# Patient Record
Sex: FEMALE | Race: WHITE | NOT HISPANIC OR LATINO | URBAN - METROPOLITAN AREA
[De-identification: names, ages, dates, MRNs, and addresses within clinical notes are randomized per-mention and may not be internally consistent; named-entity substitution may affect disease eponyms.]

---

## 2017-08-17 ENCOUNTER — COMMUNICATION - RIVER FALLS (OUTPATIENT)
Dept: FAMILY MEDICINE | Facility: CLINIC | Age: 22
End: 2017-08-17
Payer: MEDICAID

## 2017-09-07 ENCOUNTER — RECORDS - HEALTHEAST (OUTPATIENT)
Dept: ADMINISTRATIVE | Facility: OTHER | Age: 22
End: 2017-09-07

## 2017-09-08 ENCOUNTER — RECORDS - HEALTHEAST (OUTPATIENT)
Dept: ADMINISTRATIVE | Facility: OTHER | Age: 22
End: 2017-09-08

## 2017-09-08 ENCOUNTER — COMMUNICATION - HEALTHEAST (OUTPATIENT)
Dept: ADMINISTRATIVE | Facility: CLINIC | Age: 22
End: 2017-09-08

## 2017-09-24 ENCOUNTER — COMMUNICATION - HEALTHEAST (OUTPATIENT)
Dept: SCHEDULING | Facility: CLINIC | Age: 22
End: 2017-09-24

## 2017-10-10 ENCOUNTER — TRANSFERRED RECORDS (OUTPATIENT)
Dept: HEALTH INFORMATION MANAGEMENT | Facility: CLINIC | Age: 22
End: 2017-10-10

## 2017-10-21 ENCOUNTER — RECORDS - HEALTHEAST (OUTPATIENT)
Dept: ADMINISTRATIVE | Facility: OTHER | Age: 22
End: 2017-10-21

## 2017-11-03 ENCOUNTER — OFFICE VISIT - HEALTHEAST (OUTPATIENT)
Dept: ENDOCRINOLOGY | Facility: CLINIC | Age: 22
End: 2017-11-03

## 2017-11-03 DIAGNOSIS — E11.9 DIABETES (H): ICD-10-CM

## 2017-11-03 DIAGNOSIS — E10.65 TYPE 1 DIABETES MELLITUS WITH HYPERGLYCEMIA (H): ICD-10-CM

## 2017-11-03 LAB
CREAT SERPL-MCNC: 0.63 MG/DL (ref 0.6–1.1)
GFR SERPL CREATININE-BSD FRML MDRD: >60 ML/MIN/1.73M2
HBA1C MFR BLD: 8.2 % (ref 3.5–6)

## 2017-11-03 ASSESSMENT — MIFFLIN-ST. JEOR: SCORE: 1414.92

## 2017-11-17 ENCOUNTER — TELEPHONE (OUTPATIENT)
Dept: OBGYN | Facility: CLINIC | Age: 22
End: 2017-11-17

## 2017-11-17 ENCOUNTER — TRANSFERRED RECORDS (OUTPATIENT)
Dept: HEALTH INFORMATION MANAGEMENT | Facility: CLINIC | Age: 22
End: 2017-11-17

## 2017-11-17 DIAGNOSIS — O44.00: Primary | ICD-10-CM

## 2017-11-17 NOTE — TELEPHONE ENCOUNTER
Received fax from [facility] from Dr. Cross asking for US to see if pt has placenta accreta. She was at [facility] for termination around 15w2d (LMP8/2/17). If no accreta, pt is safe to return to [facility] for termination.     Scheduled pt for next Tuesday, confirmed with MD this is appropriate timeframe. She will have US and then visit after with Dr. Sepulveda. If we cannot confirm accreta here in clinic, may need to consider referral to [facility].

## 2017-11-21 ENCOUNTER — OFFICE VISIT (OUTPATIENT)
Dept: OBGYN | Facility: CLINIC | Age: 22
End: 2017-11-21
Attending: OBSTETRICS & GYNECOLOGY
Payer: COMMERCIAL

## 2017-11-21 ENCOUNTER — COMMUNICATION - HEALTHEAST (OUTPATIENT)
Dept: ENDOCRINOLOGY | Facility: CLINIC | Age: 22
End: 2017-11-21

## 2017-11-21 VITALS
DIASTOLIC BLOOD PRESSURE: 74 MMHG | HEIGHT: 64 IN | WEIGHT: 159 LBS | HEART RATE: 81 BPM | SYSTOLIC BLOOD PRESSURE: 109 MMHG | BODY MASS INDEX: 27.14 KG/M2

## 2017-11-21 DIAGNOSIS — Z33.2 ENCOUNTER FOR ELECTIVE TERMINATION OF PREGNANCY: Primary | ICD-10-CM

## 2017-11-21 DIAGNOSIS — O43.212 PLACENTA ACCRETA IN SECOND TRIMESTER: Primary | ICD-10-CM

## 2017-11-21 PROCEDURE — 76815 OB US LIMITED FETUS(S): CPT | Mod: ZF

## 2017-11-21 PROCEDURE — 99212 OFFICE O/P EST SF 10 MIN: CPT | Mod: 25,ZF

## 2017-11-21 RX ORDER — LEVOTHYROXINE SODIUM 50 UG/1
50 TABLET ORAL DAILY
COMMUNITY

## 2017-11-21 RX ORDER — DOXYCYCLINE 100 MG/10ML
100 INJECTION, POWDER, LYOPHILIZED, FOR SOLUTION INTRAVENOUS
Status: CANCELLED | OUTPATIENT
Start: 2017-11-21

## 2017-11-21 RX ORDER — PHENAZOPYRIDINE HYDROCHLORIDE 100 MG/1
200 TABLET, FILM COATED ORAL ONCE
Status: CANCELLED | OUTPATIENT
Start: 2017-11-21 | End: 2017-11-21

## 2017-11-21 NOTE — LETTER
Date:November 22, 2017      Patient was self referred, no letter generated. Do not send.        [facility] Physicians Health Information

## 2017-11-21 NOTE — MR AVS SNAPSHOT
After Visit Summary   11/21/2017    Robin Myhre    MRN: [id number]           Patient Information     Date Of Birth          1995        Visit Information        Provider Department      11/21/2017 11:00 AM [facility] ULTRASOUND [facility]        Today's Diagnoses     Placenta accreta in second trimester    -  1       Follow-ups after your visit        Your next 10 appointments already scheduled     Nov 28, 2017  3:00 PM CST   Procedure with Kiya Springer MD, [facility] RESOURCE PROCEDURE   [facility] ([facility])    [place] [facility] [address]  [facility] [id number] [phone]            Nov 28, 2017  3:30 PM CST   Nurse Visit with [facility] Nurse   [facility] ([facility])    [place] [facility] [address]  [address]  [facility] [address] [phone]            Nov 29, 2017   Procedure with Brianna Sepulveda MD   [facility] [place], Same Day Surgery (--)    [address]  [facility] [address] [phone]              Who to contact     Please call your clinic at [phone] to:    Ask questions about your health    Make or cancel appointments    Discuss your medicines    Learn about your test results    Speak to your doctor   If you have compliments or concerns about an experience at your clinic, or if you wish to file a complaint, please contact [facility] Patient Relations at [phone] or email us at [email or web address]         Additional Information About Your Visit        World Reviewer Information     World Reviewer is an electronic gateway that provides easy, online access to your medical records. With World Reviewer, you can request a clinic appointment, read your test results, renew a prescription or communicate with your care team.     To sign up for World Reviewer visit the website at  [email or web address]   You will be asked to enter the access code listed below, as well as some personal information. Please follow the directions to create your username and password.     Your access code is: [email or web address]  Expires: 2018  6:31 AM     Your access code will  in 90 days. If you need help or a new code, please contact your [facility] or call [phone] for assistance.        Care EveryWhere ID     This is your Care EveryWhere ID. This could be used by other organizations to access your [place] medical records  [id number]        Your Vitals Were     Last Period                   2017            Blood Pressure from Last 3 Encounters:   17 109/74   11 133/97    Weight from Last 3 Encounters:   17 72.1 kg (159 lb)   11 86 kg (189 lb 9.5 oz) (97 %)*     * Growth percentiles are based on [facility] 2-20 Years data.               Primary Care Provider    None Specified       No primary provider on file.        Equal Access to Services     [facility]: Hadii leann lomax Sobandar, waaxda luqadaha, qaybta kaalmayessica currie, frantz garcia . So [facility] [phone].    ATENCIÓN: Si habla español, tiene a ortega disposición servicios gratuitos de asistencia lingüística. Llame al [phone].    We comply with applicable federal civil rights laws and [place] laws. We do not discriminate on the basis of race, color, national origin, age, disability, sex, sexual orientation, or gender identity.            Thank you!     Thank you for choosing [facility]  for your care. Our goal is always to provide you with excellent care. Hearing back from our patients is one way we can continue to improve our services. Please take a few minutes to complete the written survey that you may receive in the mail after your visit with us. Thank you!             Your Updated Medication List - Protect others around  you: Learn how to safely use, store and throw away your medicines at www.disposemymeds.org.          This list is accurate as of: 11/21/17 12:59 PM.  Always use your most recent med list.                   Brand Name Dispense Instructions for use Diagnosis    insulin reg HIGH CONC 500 Units/mL injection    HumuLIN R U-500 HIGH CONC     Inject  Subcutaneous.        levothyroxine 50 MCG tablet    SYNTHROID/LEVOTHROID     Take 50 mcg by mouth daily

## 2017-11-21 NOTE — LETTER
"11/21/2017       RE: Robin Myhre  [address]   [place] [place] [address]     Dear Colleague,    Thank you for referring your patient, Robin Myhre, to the [facility] at [facility]. Please see a copy of my visit note below.    23 yo  at 15+6 BY SONO today here today to evaluate placenta.  [facility] and noted to have a previa and concern for accreta.    Ultrasound here today read by me and Dr. Kan shows placenta previa, can not rule out focal area of accreta.    PMH:  Type 1 DM\  PSH; c/s    O:  /74  Pulse 81  Ht 1.626 m (5' 4\")  Wt 72.1 kg (159 lb)  LMP 07/30/2017  BMI 27.29 kg/m2  See sono report    A/P  IUP at 15+6  Undesired/unplanned pregnancy.  [facility] at [facility] last week.  ultrasound today worrisome for focal acretta in the setting placnta previa and h/o c/s.   D and E in main OR, BAkri in room, hyst tray ready.  Pt aware may be simple D and e r may need uterine packing with BAkri balloon.  Wors case scenario is hysterectomy.  RTC next Tues 11/28 from laminaria insertion, Type and Cross for 2 units and pre-op.  D and E at 7:30 am in main OR with MKM is already scheduled.  [facility] WRTK paperwork.  Brianna Sepulveda MD        Again, thank you for allowing me to participate in the care of your patient.      Sincerely,    Brianna Sepulveda MD      "

## 2017-11-21 NOTE — PROGRESS NOTES
Indication - r/o placenta accreta, h/o C/S. Maternal age 22. LMP 08/02/2017, 15 6/7 weeks. Estimated Date of Delivery: 05/09/2018. This was a transabdominal study.    Fetal presentation - breech.    FHR = 172bpm    Placenta - anterior/left posterior - possible small focal area of placenta accreta. Placenta previa is also seen.    BRANDON = normal.    Cervix = 3.3 cm.      KAILA Long MD

## 2017-11-21 NOTE — NURSING NOTE
Chief Complaint   Patient presents with     Consult     US follow up from [facility] for possible accreta.

## 2017-11-21 NOTE — LETTER
11/21/2017       RE: Robin Myhre  [address]   [place] [address]     Dear Colleague,    Thank you for referring your patient, Robin Myhre, to the [facility] at [facility]. Please see a copy of my visit note below.    Indication - r/o placenta accreta, h/o C/S. Maternal age 22. LMP 08/02/2017, 15 6/7 weeks. Estimated Date of Delivery: 05/09/2018. This was a transabdominal study.    Fetal presentation - breech.    FHR = 172bpm    Placenta - anterior/left posterior - possible small focal area of placenta accreta. Placenta previa is also seen.    BRANDON = normal.    Cervix = 3.3 cm.      KAILA Long MD        Again, thank you for allowing me to participate in the care of your patient.      Sincerely,    [facility]

## 2017-11-21 NOTE — MR AVS SNAPSHOT
After Visit Summary   11/21/2017    Robin Myhre    MRN: [id number]           Patient Information     Date Of Birth          1995        Visit Information        Provider Department      11/21/2017 11:30 AM Brianna Sepulveda MD [facility]        Today's Diagnoses     Encounter for elective termination of pregnancy    -  1       Follow-ups after your visit        Your next 10 appointments already scheduled     Nov 28, 2017  3:00 PM CST   Procedure with Kiya Springer MD, [facility] RESOURCE PROCEDURE   [facility] ([facility])    [place] [facility] [address]  [address]  [facility] [address] [phone]            Nov 28, 2017  3:30 PM CST   Nurse Visit with [facility] Nurse   [facility] ([facility])    [place] [facility] [address]  [address]  [address]  [facility] [id number] [phone]            Nov 29, 2017   Procedure with Brianna Sepulveda MD   [facility], Same Day Surgery (--)    [address]  [facility] [address] [phone]              Who to contact     Please call your clinic at [phone] to:    Ask questions about your health    Make or cancel appointments    Discuss your medicines    Learn about your test results    Speak to your doctor   If you have compliments or concerns about an experience at your clinic, or if you wish to file a complaint, please contact [facility] Phys[facility] Patient Relations at [phone] or email us at [email or web address]         Additional Information About Your Visit        Twoodohart Information     nfon is an electronic gateway that provides easy, online access to your medical records. With nfon, you can request a clinic appointment, read your test results, renew a prescription or communicate with your care team.     To sign up for nfon visit the website at  "[email or web address]   You will be asked to enter the access code listed below, as well as some personal information. Please follow the directions to create your username and password.     Your access code is: [email or web address]  Expires: 2018  6:31 AM     Your access code will  in 90 days. If you need help or a new code, please contact your [facility] Clinic or call [phone] for assistance.        Care EveryWhere ID     This is your Care EveryWhere ID. This could be used by other organizations to access your [place] medical records  [id number]        Your Vitals Were     Pulse Height Last Period BMI (Body Mass Index)          81 1.626 m (5' 4\") 2017 27.29 kg/m2         Blood Pressure from Last 3 Encounters:   17 109/74   11 133/97    Weight from Last 3 Encounters:   17 72.1 kg (159 lb)   11 86 kg (189 lb 9.5 oz) (97 %)*     * Growth percentiles are based on [facility] 2-20 Years data.              We Performed the Following     Imelda-Operative Worksheet (WHS)        Primary Care Provider    None Specified       No primary provider on file.        Equal Access to Services     [facility] : Hadii leann fuenteso Sobandar, waaxda luqadaha, qaybta kaalmada adeegyada, frantz garcia . So [facility] [phone].    ATENCIÓN: Si habla español, tiene a ortega disposición servicios gratuitos de asistencia lingüística. Llame al [phone].    We comply with applicable federal civil rights laws and [place] laws. We do not discriminate on the basis of race, color, national origin, age, disability, sex, sexual orientation, or gender identity.            Thank you!     Thank you for choosing [facility]  for your care. Our goal is always to provide you with excellent care. Hearing back from our patients is one way we can continue to improve our services. Please take a few minutes to complete the written survey that you may " receive in the mail after your visit with us. Thank you!             Your Updated Medication List - Protect others around you: Learn how to safely use, store and throw away your medicines at www.disposemymeds.org.          This list is accurate as of: 11/21/17  1:50 PM.  Always use your most recent med list.                   Brand Name Dispense Instructions for use Diagnosis    insulin reg HIGH CONC 500 Units/mL injection    HumuLIN R U-500 HIGH CONC     Inject  Subcutaneous.        levothyroxine 50 MCG tablet    SYNTHROID/LEVOTHROID     Take 50 mcg by mouth daily

## 2017-11-21 NOTE — PROGRESS NOTES
"21 yo  at 15+6 BY SONO today here today to evaluate placenta.  [facility] and noted to have a previa and concern for accreta.    Ultrasound here today read by me and Dr. Kan shows placenta previa, can not rule out focal area of accreta.    PMH:  Type 1 DM\  PSH; c/s    O:  /74  Pulse 81  Ht 1.626 m (5' 4\")  Wt 72.1 kg (159 lb)  LMP 07/30/2017  BMI 27.29 kg/m2  See sono report    A/P  IUP at 15+6  Undesired/unplanned pregnancy.  Was at [facility] last week.  ultrasound today worrisome for focal acretta in the setting placnta previa and h/o c/s.   D and E in main OR, BAkri in room, hyst tray ready.  Pt aware may be simple D and e r may need uterine packing with BAkri balloon.  Wors case scenario is hysterectomy.  RTC next Tues 11/28 from laminaria insertion, Type and Cross for 2 units and pre-op.  D and E at 7:30 am in main OR with MKM is already scheduled.  [facility] WRTK paperwork.  Brianna Sepulveda MD      "

## 2017-11-22 ENCOUNTER — ANESTHESIA EVENT (OUTPATIENT)
Dept: SURGERY | Facility: CLINIC | Age: 22
End: 2017-11-22
Payer: COMMERCIAL

## 2017-11-22 ENCOUNTER — TELEPHONE (OUTPATIENT)
Dept: OBGYN | Facility: CLINIC | Age: 22
End: 2017-11-22

## 2017-11-22 NOTE — TELEPHONE ENCOUNTER
Moved pt appointment time on Tuesday to 2:30 nurse/ 2:45 resident for lams as [facility] is unable to perform. Called patient and left voivemail- asked pt to call back to confirm she is aware of time change

## 2017-11-28 ENCOUNTER — TELEPHONE (OUTPATIENT)
Dept: OBGYN | Facility: CLINIC | Age: 22
End: 2017-11-28

## 2017-11-28 ENCOUNTER — OFFICE VISIT (OUTPATIENT)
Dept: OBGYN | Facility: CLINIC | Age: 22
End: 2017-11-28
Payer: MEDICAID

## 2017-11-28 ENCOUNTER — ALLIED HEALTH/NURSE VISIT (OUTPATIENT)
Dept: OBGYN | Facility: CLINIC | Age: 22
End: 2017-11-28
Payer: MEDICAID

## 2017-11-28 DIAGNOSIS — Z01.818 PRE-OP EXAM: ICD-10-CM

## 2017-11-28 DIAGNOSIS — Z33.2 TERMINATION OF PREGNANCY (FETUS): Primary | ICD-10-CM

## 2017-11-28 DIAGNOSIS — N88.2 STENOSIS OF CERVIX UTERI: Primary | ICD-10-CM

## 2017-11-28 DIAGNOSIS — N88.2 CERVICAL STENOSIS (UTERINE CERVIX): Primary | ICD-10-CM

## 2017-11-28 DIAGNOSIS — O26.899 RH NEGATIVE, ANTEPARTUM: ICD-10-CM

## 2017-11-28 DIAGNOSIS — Z67.91 RH NEGATIVE, ANTEPARTUM: ICD-10-CM

## 2017-11-28 LAB
ABO + RH BLD: NORMAL
ABO + RH BLD: NORMAL
BLD GP AB SCN SERPL QL: NORMAL
BLOOD BANK CMNT PATIENT-IMP: NORMAL
ERYTHROCYTE [DISTWIDTH] IN BLOOD BY AUTOMATED COUNT: 16.5 % (ref 10–15)
HCT VFR BLD AUTO: 37.3 % (ref 35–47)
HGB BLD-MCNC: 12.4 G/DL (ref 11.7–15.7)
MCH RBC QN AUTO: 27.5 PG (ref 26.5–33)
MCHC RBC AUTO-ENTMCNC: 33.2 G/DL (ref 31.5–36.5)
MCV RBC AUTO: 83 FL (ref 78–100)
PLATELET # BLD AUTO: 228 10E9/L (ref 150–450)
RBC # BLD AUTO: 4.51 10E12/L (ref 3.8–5.2)
SPECIMEN EXP DATE BLD: NORMAL
WBC # BLD AUTO: 6.8 10E9/L (ref 4–11)

## 2017-11-28 PROCEDURE — 86900 BLOOD TYPING SEROLOGIC ABO: CPT | Performed by: STUDENT IN AN ORGANIZED HEALTH CARE EDUCATION/TRAINING PROGRAM

## 2017-11-28 PROCEDURE — 86850 RBC ANTIBODY SCREEN: CPT | Performed by: STUDENT IN AN ORGANIZED HEALTH CARE EDUCATION/TRAINING PROGRAM

## 2017-11-28 PROCEDURE — S0190 MIFEPRISTONE, ORAL, 200 MG: HCPCS | Mod: ZF

## 2017-11-28 PROCEDURE — 36415 COLL VENOUS BLD VENIPUNCTURE: CPT | Performed by: STUDENT IN AN ORGANIZED HEALTH CARE EDUCATION/TRAINING PROGRAM

## 2017-11-28 PROCEDURE — 25000132 ZZH RX MED GY IP 250 OP 250 PS 637: Mod: ZF

## 2017-11-28 PROCEDURE — 85027 COMPLETE CBC AUTOMATED: CPT | Performed by: STUDENT IN AN ORGANIZED HEALTH CARE EDUCATION/TRAINING PROGRAM

## 2017-11-28 PROCEDURE — 59200 INSERT CERVICAL DILATOR: CPT | Mod: 74

## 2017-11-28 PROCEDURE — 86901 BLOOD TYPING SEROLOGIC RH(D): CPT | Performed by: STUDENT IN AN ORGANIZED HEALTH CARE EDUCATION/TRAINING PROGRAM

## 2017-11-28 RX ORDER — METRONIDAZOLE 500 MG/1
500 TABLET ORAL ONCE
Qty: 1 TABLET | Refills: 0 | Status: ON HOLD | OUTPATIENT
Start: 2017-11-28 | End: 2017-11-29

## 2017-11-28 RX ORDER — OXYCODONE AND ACETAMINOPHEN 10; 325 MG/1; MG/1
1 TABLET ORAL EVERY 6 HOURS PRN
Qty: 6 TABLET | Refills: 0 | Status: SHIPPED | OUTPATIENT
Start: 2017-11-28

## 2017-11-28 RX ORDER — MISOPROSTOL 200 UG/1
TABLET ORAL
Qty: 2 TABLET | Refills: 0 | Status: ON HOLD | OUTPATIENT
Start: 2017-11-28 | End: 2017-11-29

## 2017-11-28 RX ORDER — MISOPROSTOL 200 UG/1
TABLET ORAL
Qty: 2 TABLET | Refills: 0 | Status: SHIPPED | OUTPATIENT
Start: 2017-11-28 | End: 2017-11-28

## 2017-11-28 RX ORDER — PROMETHAZINE HYDROCHLORIDE 25 MG/1
25 TABLET ORAL EVERY 6 HOURS PRN
Qty: 6 TABLET | Refills: 0 | Status: SHIPPED | OUTPATIENT
Start: 2017-11-28

## 2017-11-28 RX ORDER — MIFEPRISTONE 200 MG/1
200 TABLET ORAL ONCE
Qty: 1 TABLET | Refills: 0 | Status: ON HOLD | OUTPATIENT
Start: 2017-11-28 | End: 2017-11-29

## 2017-11-28 RX ORDER — PROMETHAZINE HYDROCHLORIDE 25 MG/1
25 TABLET ORAL EVERY 6 HOURS PRN
Qty: 6 TABLET | Refills: 0 | Status: SHIPPED | OUTPATIENT
Start: 2017-11-28 | End: 2017-11-28

## 2017-11-28 NOTE — PATIENT INSTRUCTIONS
You were seen for cervical dilator placement which were not able to be placed in your cervix.  Instead you were given mifepristone to start your pregnancy termination. This medication may cause infection, bleeding and cramping.  Come to the emergency department if you have fever, severe abdominal pain or heavy bleeding soaking more than a pad in 1 hour.  If your bleeding is very heavy and you are feeling lightheaded at all, call 911 for an ambulance to take you to the hospital.  For moderate pain you may take the percocet as needed. For nausea and nerves you may take the phenergan.  3 HOURS BEFORE YOUR PROCEDURE: Place 1 tablet of misoprostol in each cheek and let dissolve.

## 2017-11-28 NOTE — PROGRESS NOTES
[facility]  Gynecology Pre-op H&P    HPI:    Robin Myhre is a 22 year old  @ 16+6 by 15+6wk US, here for pre-op H&P and laminaria placement in preparation for D&E tomorrow with Dr. Sepulveda. Pregnancy c/b placenta previa and cannot rule out focal accreta. This is an undesired pregnancy and due to her placentation complication requires D&E in the hospital.  Raghav is feeling nervous but otherwise ok. Lots of good questions about likelihood of requiring hysterectomy, etc.     OBHx  Obstetric History       T1      L1     SAB0   TAB0   Ectopic0   Multiple0   Live Births0       # Outcome Date GA Lbr Mark/2nd Weight Sex Delivery Anes PTL Lv   3 Current            2             1 Term                 PMHx: DM1, hypothyroidism  PSHx: CSx1  Meds: Insulin pump-humulin, flagyl  Allergies:  Kelfex    SocHx: No tobacco this pregnancy, former smoker. No etOH or drugs.    ROS: 10-Point ROS negative except as noted in HPI    Physical Exam  LMP 2017   Gen: Well-appearing, NAD  Cardiac: RRR, no m/r/g  Pulm:  CTAB  Abd: Soft, non-tender, non-distended  Ext: No LE edema, extremities warm and well perfused    Pelvic:  Normal appearing external female genitalia. Normal hair distribution. Vagina is without lesions. Physiologic discharge. Cervix multiparous, no lesions, no cervical motion tenderness.    US (17):   Fetal presentation - breech.  FHR = 172bpm  Placenta - anterior/left posterior - possible small focal area of placenta accreta. Placenta previa is also seen.  BRANDON = normal.  Cervix = 3.3 cm.    Assessment/Plan:  Robin Myhre is a 22 year old  @ 16+6 here for laminaria placement in preparation for D&E in pregnancy c/b placenta previa with possible accreta.  - T&C x3 units, Hgb, to get drawn today  - see separate laminaria placement procedure note -- unable to place laminaria.   - given mifepristone in clinic  - miso 400 mcg buccally 3 hours before case  - RX for Phenergan and Percocet  given to take PRN overnight    - strict precautions on when to come to the ED/call 911 overnight.    Heavy bleeding >1 pad per hour, dizziness, severe abd pain, fever    Staffed with Dr. Daniel Plasencia MD  OBGYN PGY-2  [phone]  2017, 3:28 PM    Laminaria Placement Procedure    Patient name,  verified. Discussed the risks, benefits and alternatives with the patient. She agreed to proceed, written informed consent obatined.  Speculum was placed in vaginal vault with above noted findings. The cervix was cleaned with betadine then grasped at the anterior lip following 1mL infiltration with 1% plain lidocaine. Paracervical block was achieved via 8mL lidocaine at 4 and 8 o clock of the cervicovaginal junction. The dilipan was attempted to be placed with traction on the tenaculum but firm resistance was met at the internal os. A Hegar dilator similarly met excess resistance of the internal os and this the procedure was aborted. Tenaculum and speculum removed. Pt tolerated the procedure well.    Caitie Plasencia MD  OB Gyn PGY2  17 4:53pm    The Patient was seen in Resident Continuity Clinic by    CAITIE PLASENCIA  [facility] RESOURCE PROCEDURE.  I reviewed the history & exam. Assessment and plan were jointly made.  I was present for exam and attempt to place laminaria.     Anyi Sanchez MD

## 2017-11-28 NOTE — TELEPHONE ENCOUNTER
Pt called to inform she may need to cancel LAMs and surgery tomorrow morning due to court date she has in the morning on 11/29. Pt will be attempting to move other obligations to make this appointment.

## 2017-11-28 NOTE — TELEPHONE ENCOUNTER
Called and spoke with patient regarding appointment time change. Pt expresses understanding of this information and states will present to clinic at 2:30.

## 2017-11-28 NOTE — MR AVS SNAPSHOT
After Visit Summary   11/28/2017    Robin Myhre    MRN: [id number]           Patient Information     Date Of Birth          1995        Visit Information        Provider Department      11/28/2017 2:45 PM Viviane Plasencia MD; [facility] RESOURCE PROCEDURE [facility]        Today's Diagnoses     Termination of pregnancy (fetus)    -  1      Care Instructions    You were seen for cervical dilator placement which were not able to be placed in your cervix.  Instead you were given mifepristone to start your pregnancy termination. This medication may cause infection, bleeding and cramping.  Come to the emergency department if you have fever, severe abdominal pain or heavy bleeding soaking more than a pad in 1 hour.  If your bleeding is very heavy and you are feeling lightheaded at all, call 911 for an ambulance to take you to the hospital.  For moderate pain you may take the percocet as needed. For nausea and nerves you may take the phenergan.  3 HOURS BEFORE YOUR PROCEDURE: Place 1 tablet of misoprostol in each cheek and let dissolve.          Follow-ups after your visit        Your next 10 appointments already scheduled     Nov 29, 2017   Procedure with Brianna Sepulveda MD   [facility], [place], Same Day Surgery (--)    [address] [facility] [address] [phone]              Who to contact     Please call your clinic at [phone] to:    Ask questions about your health    Make or cancel appointments    Discuss your medicines    Learn about your test results    Speak to your doctor   If you have compliments or concerns about an experience at your clinic, or if you wish to file a complaint, please contact [facility] Patient Relations at [phone] or email us at [email or web address]         Additional Information About Your Visit        Referronhart Information     HiperScan is an electronic gateway that provides easy, online access  to your medical records. With AwesomeHighlighter, you can request a clinic appointment, read your test results, renew a prescription or communicate with your care team.     To sign up for AwesomeHighlighter visit the website at [email or web address]   You will be asked to enter the access code listed below, as well as some personal information. Please follow the directions to create your username and password.     Your access code is: [email or web address]  Expires: 2018  6:31 AM     Your access code will  in 90 days. If you need help or a new code, please contact your [facility] or call [phone] for assistance.        Care EveryWhere ID     This is your Care EveryWhere ID. This could be used by other organizations to access your [place] medical records  [id number]        Your Vitals Were     Last Period                   2017            Blood Pressure from Last 3 Encounters:   17 109/74   11 133/97    Weight from Last 3 Encounters:   17 72.1 kg (159 lb)   11 86 kg (189 lb 9.5 oz) (97 %)*     * Growth percentiles are based on CDC 2-20 Years data.              We Performed the Following     ABO/Rh Type and Screen     CBC with Platelets          Today's Medication Changes          These changes are accurate as of: 17  4:32 PM.  If you have any questions, ask your nurse or doctor.               Start taking these medicines.        Dose/Directions    metroNIDAZOLE 500 MG tablet   Commonly known as:  FLAGYL   Used for:  Pre-op exam   Started by:  Nurse, Ump Whs        Dose:  500 mg   Take 1 tablet (500 mg) by mouth once for 1 dose   Quantity:  1 tablet   Refills:  0       * misoprostol 200 MCG tablet   Commonly known as:  CYTOTEC   Used for:  Cervical stenosis (uterine cervix)   Started by:  Nurse, Ump Whs        Take 2-3 hours before SURGERY. Place 1 tab between cheek and gum on the right, and 2nd tab on the left. Dissolve for 30 min, then swallow.   Quantity:   2 tablet   Refills:  0       * misoprostol 200 MCG tablet   Commonly known as:  CYTOTEC   Used for:  Termination of pregnancy (fetus)   Started by:  Viviane Plasencia MD        Place 1 tablet in each cheek 3 hours prior to your procedure. Let dissolve. After 30 minutes can swallow what has not dissolved.   Quantity:  2 tablet   Refills:  0       oxyCODONE-acetaminophen  MG per tablet   Commonly known as:  PERCOCET   Used for:  Termination of pregnancy (fetus)   Started by:  Viviane Plasencia MD        Dose:  1 tablet   Take 1 tablet by mouth every 6 hours as needed for moderate to severe pain maximum 6 tablet(s) per day   Quantity:  6 tablet   Refills:  0       promethazine 25 MG tablet   Commonly known as:  PHENERGAN   Used for:  Termination of pregnancy (fetus)   Started by:  Viviane Plasencia MD        Dose:  25 mg   Take 1 tablet (25 mg) by mouth every 6 hours as needed for nausea   Quantity:  6 tablet   Refills:  0       * Notice:  This list has 2 medication(s) that are the same as other medications prescribed for you. Read the directions carefully, and ask your doctor or other care provider to review them with you.         Where to get your medicines      These medications were sent to [place], [place] - [address]  [address] [address]     Phone:  [phone]     misoprostol 200 MCG tablet    promethazine 25 MG tablet         These medications were sent to [facility] [address] [place] [address] [address] [facility]  [address] [facility] [facility] [address]    Hours:  24-hours Phone:  [phone]     misoprostol 200 MCG tablet         Some of these will need a paper prescription and others can be bought over the counter.  Ask your nurse if you have questions.     Bring a paper prescription for each of these medications     metroNIDAZOLE 500 MG tablet    oxyCODONE-acetaminophen  MG per tablet                 Primary Care Provider    None Specified       No primary provider on file.        Equal Access to Services     WILLIAM DERAS : Hadii aad ku hadjoseemily Doss, wazuriyessica lehman, jordonarnulfo petercatiefrantz weaverlorenzoute patel. So [facility] [phone].    ATENCIÓN: Si habla español, tiene a ortega disposición servicios gratuitos de asistencia lingüística. Llame al [phone].    We comply with applicable federal civil rights laws and [place] laws. We do not discriminate on the basis of race, color, national origin, age, disability, sex, sexual orientation, or gender identity.            Thank you!     Thank you for choosing [facility]  for your care. Our goal is always to provide you with excellent care. Hearing back from our patients is one way we can continue to improve our services. Please take a few minutes to complete the written survey that you may receive in the mail after your visit with us. Thank you!             Your Updated Medication List - Protect others around you: Learn how to safely use, store and throw away your medicines at www.disposemymeds.org.          This list is accurate as of: 11/28/17  4:32 PM.  Always use your most recent med list.                   Brand Name Dispense Instructions for use Diagnosis    insulin reg HIGH CONC 500 Units/mL injection    HumuLIN R U-500 HIGH CONC     Inject  Subcutaneous.        levothyroxine 50 MCG tablet    SYNTHROID/LEVOTHROID     Take 50 mcg by mouth daily        metroNIDAZOLE 500 MG tablet    FLAGYL    1 tablet    Take 1 tablet (500 mg) by mouth once for 1 dose    Pre-op exam       * misoprostol 200 MCG tablet    CYTOTEC    2 tablet    Take 2-3 hours before SURGERY. Place 1 tab between cheek and gum on the right, and 2nd tab on the left. Dissolve for 30 min, then swallow.    Cervical stenosis (uterine cervix)       * misoprostol 200 MCG tablet    CYTOTEC    2 tablet    Place 1 tablet in each cheek 3 hours  prior to your procedure. Let dissolve. After 30 minutes can swallow what has not dissolved.    Termination of pregnancy (fetus)       oxyCODONE-acetaminophen  MG per tablet    PERCOCET    6 tablet    Take 1 tablet by mouth every 6 hours as needed for moderate to severe pain maximum 6 tablet(s) per day    Termination of pregnancy (fetus)       promethazine 25 MG tablet    PHENERGAN    6 tablet    Take 1 tablet (25 mg) by mouth every 6 hours as needed for nausea    Termination of pregnancy (fetus)       * Notice:  This list has 2 medication(s) that are the same as other medications prescribed for you. Read the directions carefully, and ask your doctor or other care provider to review them with you.

## 2017-11-29 ENCOUNTER — TELEPHONE (OUTPATIENT)
Dept: OBGYN | Facility: CLINIC | Age: 22
End: 2017-11-29

## 2017-11-29 ENCOUNTER — HOSPITAL ENCOUNTER (OUTPATIENT)
Facility: CLINIC | Age: 22
Discharge: HOME | End: 2017-11-29
Attending: OBSTETRICS & GYNECOLOGY | Admitting: OBSTETRICS & GYNECOLOGY
Payer: COMMERCIAL

## 2017-11-29 ENCOUNTER — ANESTHESIA (OUTPATIENT)
Dept: SURGERY | Facility: CLINIC | Age: 22
End: 2017-11-29
Payer: COMMERCIAL

## 2017-11-29 ENCOUNTER — SURGERY (OUTPATIENT)
Age: 22
End: 2017-11-29

## 2017-11-29 VITALS
HEART RATE: 85 BPM | SYSTOLIC BLOOD PRESSURE: 115 MMHG | HEIGHT: 64 IN | TEMPERATURE: 98.7 F | DIASTOLIC BLOOD PRESSURE: 80 MMHG | BODY MASS INDEX: 27.14 KG/M2 | WEIGHT: 158.95 LBS | OXYGEN SATURATION: 96 % | RESPIRATION RATE: 15 BRPM

## 2017-11-29 DIAGNOSIS — G89.18 POST-OP PAIN: Primary | ICD-10-CM

## 2017-11-29 LAB
BLOOD BANK CMNT PATIENT-IMP: NORMAL
BLOOD BANK CMNT PATIENT-IMP: NORMAL
GLUCOSE BLDC GLUCOMTR-MCNC: 108 MG/DL (ref 70–99)

## 2017-11-29 PROCEDURE — 86900 BLOOD TYPING SEROLOGIC ABO: CPT | Performed by: OBSTETRICS & GYNECOLOGY

## 2017-11-29 PROCEDURE — 25000128 H RX IP 250 OP 636: Performed by: ANESTHESIOLOGY

## 2017-11-29 PROCEDURE — 25000128 H RX IP 250 OP 636: Performed by: NURSE ANESTHETIST, CERTIFIED REGISTERED

## 2017-11-29 PROCEDURE — 40000170 ZZH STATISTIC PRE-PROCEDURE ASSESSMENT II: Performed by: OBSTETRICS & GYNECOLOGY

## 2017-11-29 PROCEDURE — 36415 COLL VENOUS BLD VENIPUNCTURE: CPT | Performed by: OBSTETRICS & GYNECOLOGY

## 2017-11-29 PROCEDURE — 86901 BLOOD TYPING SEROLOGIC RH(D): CPT | Performed by: OBSTETRICS & GYNECOLOGY

## 2017-11-29 PROCEDURE — 88300 SURGICAL PATH GROSS: CPT | Mod: 26 | Performed by: OBSTETRICS & GYNECOLOGY

## 2017-11-29 PROCEDURE — 25000132 ZZH RX MED GY IP 250 OP 250 PS 637: Performed by: STUDENT IN AN ORGANIZED HEALTH CARE EDUCATION/TRAINING PROGRAM

## 2017-11-29 PROCEDURE — 36000051 ZZH SURGERY LEVEL 2 1ST 30 MIN - UMMC: Performed by: OBSTETRICS & GYNECOLOGY

## 2017-11-29 PROCEDURE — 25000125 ZZHC RX 250: Performed by: NURSE ANESTHETIST, CERTIFIED REGISTERED

## 2017-11-29 PROCEDURE — 37000008 ZZH ANESTHESIA TECHNICAL FEE, 1ST 30 MIN: Performed by: OBSTETRICS & GYNECOLOGY

## 2017-11-29 PROCEDURE — 71000027 ZZH RECOVERY PHASE 2 EACH 15 MINS: Performed by: OBSTETRICS & GYNECOLOGY

## 2017-11-29 PROCEDURE — 25000132 ZZH RX MED GY IP 250 OP 250 PS 637: Performed by: NURSE ANESTHETIST, CERTIFIED REGISTERED

## 2017-11-29 PROCEDURE — 27210794 ZZH OR GENERAL SUPPLY STERILE: Performed by: OBSTETRICS & GYNECOLOGY

## 2017-11-29 PROCEDURE — 85461 HEMOGLOBIN FETAL: CPT | Performed by: OBSTETRICS & GYNECOLOGY

## 2017-11-29 PROCEDURE — 25000128 H RX IP 250 OP 636: Performed by: STUDENT IN AN ORGANIZED HEALTH CARE EDUCATION/TRAINING PROGRAM

## 2017-11-29 PROCEDURE — 25000125 ZZHC RX 250: Performed by: OBSTETRICS & GYNECOLOGY

## 2017-11-29 PROCEDURE — 88300 SURGICAL PATH GROSS: CPT | Performed by: OBSTETRICS & GYNECOLOGY

## 2017-11-29 PROCEDURE — 36000053 ZZH SURGERY LEVEL 2 EA 15 ADDTL MIN - UMMC: Performed by: OBSTETRICS & GYNECOLOGY

## 2017-11-29 PROCEDURE — 37000009 ZZH ANESTHESIA TECHNICAL FEE, EACH ADDTL 15 MIN: Performed by: OBSTETRICS & GYNECOLOGY

## 2017-11-29 PROCEDURE — 82962 GLUCOSE BLOOD TEST: CPT

## 2017-11-29 RX ORDER — DEXAMETHASONE SODIUM PHOSPHATE 4 MG/ML
INJECTION, SOLUTION INTRA-ARTICULAR; INTRALESIONAL; INTRAMUSCULAR; INTRAVENOUS; SOFT TISSUE PRN
Status: DISCONTINUED | OUTPATIENT
Start: 2017-11-29 | End: 2017-11-29

## 2017-11-29 RX ORDER — OXYCODONE AND ACETAMINOPHEN 5; 325 MG/1; MG/1
1 TABLET ORAL EVERY 4 HOURS PRN
Status: DISCONTINUED | OUTPATIENT
Start: 2017-11-29 | End: 2017-11-29 | Stop reason: HOSPADM

## 2017-11-29 RX ORDER — DOXYCYCLINE 100 MG/10ML
100 INJECTION, POWDER, LYOPHILIZED, FOR SOLUTION INTRAVENOUS
Status: COMPLETED | OUTPATIENT
Start: 2017-11-29 | End: 2017-11-29

## 2017-11-29 RX ORDER — PROPOFOL 10 MG/ML
INJECTION, EMULSION INTRAVENOUS CONTINUOUS PRN
Status: DISCONTINUED | OUTPATIENT
Start: 2017-11-29 | End: 2017-11-29

## 2017-11-29 RX ORDER — NALOXONE HYDROCHLORIDE 0.4 MG/ML
.1-.4 INJECTION, SOLUTION INTRAMUSCULAR; INTRAVENOUS; SUBCUTANEOUS
Status: DISCONTINUED | OUTPATIENT
Start: 2017-11-29 | End: 2017-11-29 | Stop reason: HOSPADM

## 2017-11-29 RX ORDER — FENTANYL CITRATE 50 UG/ML
INJECTION, SOLUTION INTRAMUSCULAR; INTRAVENOUS PRN
Status: DISCONTINUED | OUTPATIENT
Start: 2017-11-29 | End: 2017-11-29

## 2017-11-29 RX ORDER — IBUPROFEN 600 MG/1
600 TABLET, FILM COATED ORAL
Status: COMPLETED | OUTPATIENT
Start: 2017-11-29 | End: 2017-11-29

## 2017-11-29 RX ORDER — ONDANSETRON 2 MG/ML
4 INJECTION INTRAMUSCULAR; INTRAVENOUS EVERY 30 MIN PRN
Status: DISCONTINUED | OUTPATIENT
Start: 2017-11-29 | End: 2017-11-29 | Stop reason: HOSPADM

## 2017-11-29 RX ORDER — OXYCODONE AND ACETAMINOPHEN 5; 325 MG/1; MG/1
1 TABLET ORAL ONCE
Status: DISCONTINUED | OUTPATIENT
Start: 2017-11-29 | End: 2017-11-29 | Stop reason: HOSPADM

## 2017-11-29 RX ORDER — LIDOCAINE HYDROCHLORIDE 10 MG/ML
INJECTION, SOLUTION INFILTRATION; PERINEURAL PRN
Status: DISCONTINUED | OUTPATIENT
Start: 2017-11-29 | End: 2017-11-29

## 2017-11-29 RX ORDER — LIDOCAINE 40 MG/G
CREAM TOPICAL
Status: DISCONTINUED | OUTPATIENT
Start: 2017-11-29 | End: 2017-11-29 | Stop reason: HOSPADM

## 2017-11-29 RX ORDER — OXYCODONE AND ACETAMINOPHEN 5; 325 MG/1; MG/1
1 TABLET ORAL EVERY 4 HOURS PRN
Qty: 5 TABLET | Refills: 0 | Status: SHIPPED | OUTPATIENT
Start: 2017-11-29

## 2017-11-29 RX ORDER — IBUPROFEN 600 MG/1
600 TABLET, FILM COATED ORAL ONCE
Status: DISCONTINUED | OUTPATIENT
Start: 2017-11-29 | End: 2017-11-29 | Stop reason: HOSPADM

## 2017-11-29 RX ORDER — METHYLERGONOVINE MALEATE 0.2 MG/1
TABLET ORAL PRN
Status: DISCONTINUED | OUTPATIENT
Start: 2017-11-29 | End: 2017-11-29

## 2017-11-29 RX ORDER — LIDOCAINE HYDROCHLORIDE 10 MG/ML
INJECTION, SOLUTION INFILTRATION; PERINEURAL PRN
Status: DISCONTINUED | OUTPATIENT
Start: 2017-11-29 | End: 2017-11-29 | Stop reason: HOSPADM

## 2017-11-29 RX ORDER — MEPERIDINE HYDROCHLORIDE 25 MG/ML
12.5 INJECTION INTRAMUSCULAR; INTRAVENOUS; SUBCUTANEOUS EVERY 5 MIN PRN
Status: DISCONTINUED | OUTPATIENT
Start: 2017-11-29 | End: 2017-11-29 | Stop reason: HOSPADM

## 2017-11-29 RX ORDER — PROPOFOL 10 MG/ML
INJECTION, EMULSION INTRAVENOUS PRN
Status: DISCONTINUED | OUTPATIENT
Start: 2017-11-29 | End: 2017-11-29

## 2017-11-29 RX ORDER — DEXAMETHASONE SODIUM PHOSPHATE 4 MG/ML
4 INJECTION, SOLUTION INTRA-ARTICULAR; INTRALESIONAL; INTRAMUSCULAR; INTRAVENOUS; SOFT TISSUE
Status: DISCONTINUED | OUTPATIENT
Start: 2017-11-29 | End: 2017-11-29 | Stop reason: HOSPADM

## 2017-11-29 RX ORDER — FENTANYL CITRATE 50 UG/ML
25-50 INJECTION, SOLUTION INTRAMUSCULAR; INTRAVENOUS
Status: DISCONTINUED | OUTPATIENT
Start: 2017-11-29 | End: 2017-11-29 | Stop reason: HOSPADM

## 2017-11-29 RX ORDER — ONDANSETRON 2 MG/ML
INJECTION INTRAMUSCULAR; INTRAVENOUS PRN
Status: DISCONTINUED | OUTPATIENT
Start: 2017-11-29 | End: 2017-11-29

## 2017-11-29 RX ORDER — IBUPROFEN 600 MG/1
600 TABLET, FILM COATED ORAL EVERY 6 HOURS PRN
Qty: 30 TABLET | Refills: 0 | Status: SHIPPED | OUTPATIENT
Start: 2017-11-29

## 2017-11-29 RX ORDER — SODIUM CHLORIDE, SODIUM LACTATE, POTASSIUM CHLORIDE, CALCIUM CHLORIDE 600; 310; 30; 20 MG/100ML; MG/100ML; MG/100ML; MG/100ML
INJECTION, SOLUTION INTRAVENOUS CONTINUOUS
Status: DISCONTINUED | OUTPATIENT
Start: 2017-11-29 | End: 2017-11-29 | Stop reason: HOSPADM

## 2017-11-29 RX ORDER — ONDANSETRON 4 MG/1
4 TABLET, ORALLY DISINTEGRATING ORAL EVERY 30 MIN PRN
Status: DISCONTINUED | OUTPATIENT
Start: 2017-11-29 | End: 2017-11-29 | Stop reason: HOSPADM

## 2017-11-29 RX ORDER — LIDOCAINE HYDROCHLORIDE 20 MG/ML
INJECTION, SOLUTION INFILTRATION; PERINEURAL PRN
Status: DISCONTINUED | OUTPATIENT
Start: 2017-11-29 | End: 2017-11-29

## 2017-11-29 RX ORDER — PHENAZOPYRIDINE HYDROCHLORIDE 200 MG/1
200 TABLET, FILM COATED ORAL ONCE
Status: DISCONTINUED | OUTPATIENT
Start: 2017-11-29 | End: 2017-11-29

## 2017-11-29 RX ADMIN — LIDOCAINE HYDROCHLORIDE 20 ML: 10 INJECTION, SOLUTION INFILTRATION; PERINEURAL at 07:56

## 2017-11-29 RX ADMIN — PROPOFOL 150 MCG/KG/MIN: 10 INJECTION, EMULSION INTRAVENOUS at 07:37

## 2017-11-29 RX ADMIN — METHYLERGONOVINE MALEATE 200 MCG: 0.2 TABLET ORAL at 08:20

## 2017-11-29 RX ADMIN — ONDANSETRON 4 MG: 2 INJECTION INTRAMUSCULAR; INTRAVENOUS at 07:25

## 2017-11-29 RX ADMIN — RHO(D) IMMUNE GLOBULIN (HUMAN) 300 MCG: 1500 SOLUTION INTRAMUSCULAR at 11:20

## 2017-11-29 RX ADMIN — LIDOCAINE HYDROCHLORIDE 80 MG: 20 INJECTION, SOLUTION INFILTRATION; PERINEURAL at 07:36

## 2017-11-29 RX ADMIN — LIDOCAINE HYDROCHLORIDE 0.2 ML: 10 INJECTION, SOLUTION INFILTRATION; PERINEURAL at 07:05

## 2017-11-29 RX ADMIN — FENTANYL CITRATE 50 MCG: 50 INJECTION, SOLUTION INTRAMUSCULAR; INTRAVENOUS at 08:15

## 2017-11-29 RX ADMIN — FENTANYL CITRATE 50 MCG: 50 INJECTION, SOLUTION INTRAMUSCULAR; INTRAVENOUS at 07:36

## 2017-11-29 RX ADMIN — FENTANYL CITRATE 50 MCG: 50 INJECTION, SOLUTION INTRAMUSCULAR; INTRAVENOUS at 08:42

## 2017-11-29 RX ADMIN — MIDAZOLAM HYDROCHLORIDE 2 MG: 1 INJECTION, SOLUTION INTRAMUSCULAR; INTRAVENOUS at 07:24

## 2017-11-29 RX ADMIN — DEXAMETHASONE SODIUM PHOSPHATE 8 MG: 4 INJECTION, SOLUTION INTRAMUSCULAR; INTRAVENOUS at 08:04

## 2017-11-29 RX ADMIN — HYDROMORPHONE HYDROCHLORIDE 0.3 MG: 1 INJECTION, SOLUTION INTRAMUSCULAR; INTRAVENOUS; SUBCUTANEOUS at 08:51

## 2017-11-29 RX ADMIN — FENTANYL CITRATE 50 MCG: 50 INJECTION, SOLUTION INTRAMUSCULAR; INTRAVENOUS at 07:46

## 2017-11-29 RX ADMIN — DOXYCYCLINE 100 MG: 100 INJECTION, POWDER, LYOPHILIZED, FOR SOLUTION INTRAVENOUS at 07:48

## 2017-11-29 RX ADMIN — IBUPROFEN 600 MG: 600 TABLET ORAL at 09:05

## 2017-11-29 RX ADMIN — PROPOFOL 20 MG: 10 INJECTION, EMULSION INTRAVENOUS at 07:40

## 2017-11-29 RX ADMIN — HYDROMORPHONE HYDROCHLORIDE 0.2 MG: 1 INJECTION, SOLUTION INTRAMUSCULAR; INTRAVENOUS; SUBCUTANEOUS at 09:30

## 2017-11-29 RX ADMIN — SODIUM CHLORIDE, POTASSIUM CHLORIDE, SODIUM LACTATE AND CALCIUM CHLORIDE: 600; 310; 30; 20 INJECTION, SOLUTION INTRAVENOUS at 07:28

## 2017-11-29 RX ADMIN — PROPOFOL 60 MG: 10 INJECTION, EMULSION INTRAVENOUS at 07:36

## 2017-11-29 RX ADMIN — OXYCODONE HYDROCHLORIDE AND ACETAMINOPHEN 1 TABLET: 5; 325 TABLET ORAL at 10:52

## 2017-11-29 ASSESSMENT — LIFESTYLE VARIABLES: TOBACCO_USE: 1

## 2017-11-29 NOTE — ANESTHESIA PREPROCEDURE EVALUATION
Anesthesia Evaluation     . Pt has not had prior anesthetic            ROS/MED HX    ENT/Pulmonary:     (+)tobacco use, Past use , . .    Neurologic:  - neg neurologic ROS     Cardiovascular:  - neg cardiovascular ROS       METS/Exercise Tolerance:     Hematologic:  - neg hematologic  ROS       Musculoskeletal:  - neg musculoskeletal ROS       GI/Hepatic:  - neg GI/hepatic ROS       Renal/Genitourinary:  - ROS Renal section negative       Endo:     (+) type I DM, .      Psychiatric:  - neg psychiatric ROS       Infectious Disease:  - neg infectious disease ROS       Malignancy:         Other:                     Physical Exam  Normal systems: cardiovascular, pulmonary and dental    Airway   Mallampati: II  TM distance: >3 FB  Neck ROM: full    Dental     Cardiovascular       Pulmonary                     Anesthesia Plan      History & Physical Review  History and physical reviewed and following examination; no interval change.    ASA Status:  2 .        Plan for General and ETT with Propofol induction. Maintenance will be Balanced.    PONV prophylaxis:  Ondansetron (or other 5HT-3) and Dexamethasone or Solumedrol  Additional equipment: 2nd IV      Postoperative Care  Postoperative pain management:  IV analgesics and Oral pain medications.      Consents  Anesthetic plan, risks, benefits and alternatives discussed with:  Patient.  Use of blood products discussed: Yes.   Use of blood products discussed with Patient.  Consented to blood products.  .                          .

## 2017-11-29 NOTE — OR NURSING
Pain medication oral given for pain. Patient states the pain is at a 5-6. On her phone continually

## 2017-11-29 NOTE — OR NURSING
Rhogam given, ready for discharge. Informed Raghav Resendiz cannot have another oxycodone until 1440. Medication handed to Astrid

## 2017-11-29 NOTE — OR NURSING
"Raghav is yelling on the phone. Telling someone to leave her alone. She will not communicate to nurse what is going on. \"I'm going to block the fucking number\" Patients mother in law left the room. Waiting for Rhogam shot from Lab to be given to patient before discharge  "

## 2017-11-29 NOTE — TELEPHONE ENCOUNTER
Telephone call  11/29/2017     Raghav called the Care Line wondering if she can have some water prior to her 0730 procedure today, since her mouth is dry.     I told her that no, that is against anesthesia guidelines and would increase her risk of perioperative complications. She could at most swish water in her mouth but cannot swallow. She will receive IV hydration when she gets to the hospital in just a couple of hours, and will be able to drink after the surgery.     She expressed understanding and had no further questions.     Sarah Waters, PGY3  OB/Gyn  11/29/2017, 4:12 AM

## 2017-11-29 NOTE — OP NOTE
[facility]   Operative Note    Date of service: 2017    Name: Robin Myhre   MRN: [id number]   : 1995     Pre-operative diagnosis:  1. IUP at 16w5d by 15w6d US    Post-operative diagnosis:  1.   S/p dilation and evacuation    Surgeon:  MD Anyi Andrea MD    Assistant:  Iris Valenzuela, PGY 1    Procedure:  1. Dilation and Evacuation    Anesthesia: MAC   Fluids: 900mL  EBL: 200mL    Complications: None  Specimen: Products of conception, gross examination only requested    Indication: Robin Myhre is a  at 16w5d by 15w6d US who desires pregnancy termination. Her ultrasound showed a placenta previa with a focal area concerning for accreta so the decision was made to perform her procedure in the hospital setting. Risk, benefits, and alternatives of procedure were discussed, informed consent was obtained. The risk of increased bleeding due to the     Findings:   1. EUA reveals normal cervix closed at internal os, anteverted uterus, no adnexal masses  2. Products of conception examined for complete fetal parts  3. Sharp curettage revealed gritty uterine lining  4. Ultrasound showed empty uterus at conclusion of case  5. Cervical laceration with minor bleeding, repaired     Procedure:  Patient was taken to the OR where she underwent MAC anesthesia without difficulty. Once patient was comfortable she was positioned in the dorsal lithotomy position.  She was then prepped and draped in the normal sterile fashion.  A timeout was performed and the proper patient and procedure were identified.  A sterile speculum was placed in the vagina and the cervix was visualized. 1cc of 1% plain lidocaine was injected into the anterior lip of the cervix and a single toothed tenaculum was applied. The 4 and 8 o'clock positions of the cervical vaginal junction were then injected with 10mL of  1% lidocaine on each side. The cervix was progressively dilated to 51mm. Near the end of the dilation, the tenaculum tore  from the cervix and was replaced. At that time, hemostasis at the cervical laceration was noted and the dilation was completed without difficulty. A size 16 mm firm, curved suction tip was then placed into the cervical canal without difficulty and used to break the membranes and suction the amniotic fluid.  The Amy forceps was used to evacuate the fetal tissue. Multiple passes were made with good return of tissue noted. The fetal tissue was examined for above findings. The suction currette was used for final pass and a gritty texture was noted on all four sides. There was a hyperechoic spot on the anterior uterus seen on ultrasound on the anterior   A sharp curette was then placed through the cervix and used to curette against the anterior wall under ultrasound guidance and was noted to be gritty. A final pass was made with the suction currette to clear uterus of remaining blood and tissue.  At this point, there was a continuous flow of bleeding noted and thought initially to be from the os. A bimanual massage was performed and the uterus was noted to be firm, however 0.2mg of methergine were still given in the right deltoid. On further examination, the bleeding appeared to be from the cervical laceration that initially had been hemostatic. The laceration was repaired with 2-0 Vicryl is a running locked fashion and then noted to be hemostatic. There was no additional bleeding noted. Ultrasound was again used to confirm that the uterus was empty. The tenaculum was removed from the second site and these sites were noted to be hemostatic. The speculum was then removed. The patient tolerated the procedure well and was taken to the PACU for recovery in stable condition. Instrument, needle and sponge counts correct x2.  Dr. Sepulveda was scrubbed and present for the entire procedure.     Dr. Sanchez was present for the entire procedure and performed the ultrasound guidance. She did not participate in any other aspect of  the surgery.     Iris Valenzuela MD   Resident Physician, PGY1  Obstetrics, Gynecology, and Women's Health    Brianna Sepulveda MD

## 2017-11-29 NOTE — TELEPHONE ENCOUNTER
Telephone call  11/29/2017    A second call from Raghav regarding preoperative care. She is actually in the car being driven to the hospital. She has type 1 diabetes and uses an insulin pump. She checked her blood sugar as she normally would and found it to be 89. She is not symptomatically hypoglycemic, although she is slightly nauseous. She turned off her insulin pump because she was concerned her blood glucose could go lower. She has glucose tabs with her but has not used any.     I do not see any plan formulated in her chart regarding her perioperative diabetes care. Since she is already on her way to the hospital we decided it would be best for her to keep her pump off for now. If she is symptomatically hypoglycemic she should take her blood sugar. If it is <60 she should take a glucose tab, or if she is symptomatic and <70.    Sarah Waters, PGY3  OB/Gyn  11/29/2017, 4:42 AM

## 2017-11-29 NOTE — ANESTHESIA CARE TRANSFER NOTE
Patient: Raghav Myhrafita    Procedure(s):  Dilation And Evacuation - Wound Class: II-Clean Contaminated    Diagnosis: Possible Placenta Acredia and Termination   Diagnosis Additional Information: No value filed.    Anesthesia Type:   General, ETT     Note:  Airway :Room Air  Patient transferred to:PACU  Comments: Raghav is crying on arrival to Phase 2, but she is not experiencing surgical discomfort.  She feels mild nausea and asked for the HOB to be lowered.  Report was given and questions answered.Handoff Report: Identifed the Patient, Identified the Reponsible Provider, Reviewed the pertinent medical history, Discussed the surgical course, Reviewed Intra-OP anesthesia mangement and issues during anesthesia, Set expectations for post-procedure period and Allowed opportunity for questions and acknowledgement of understanding      Vitals: (Last set prior to Anesthesia Care Transfer)    CRNA VITALS  11/29/2017 0803 - 11/29/2017 0840      11/29/2017             Resp Rate (observed): (!)  1                Electronically Signed By: Jeff Monk CRNA, APRN CRNA  November 29, 2017  8:40 AM

## 2017-11-29 NOTE — OR NURSING
Dr Valenzuela came and spoke with patient. Patient was informed she needed to progress to the recliner before another pain medication was given. Patient agreed with this. Walked to bathroom and voided on phone sending texts at this time. States her pain is at a 10.

## 2017-11-29 NOTE — DISCHARGE INSTRUCTIONS
[facility]  Same-Day Surgery   Adult Discharge Orders & Instructions     For 24 hours after surgery    1. Get plenty of rest.  A responsible adult must stay with you for at least 24 hours after you leave the hospital.   2. Do not drive or use heavy equipment.  If you have weakness or tingling, don't drive or use heavy equipment until this feeling goes away.  3. Do not drink alcohol.  4. Avoid strenuous or risky activities.  Ask for help when climbing stairs.   5. You may feel lightheaded.  IF so, sit for a few minutes before standing.  Have someone help you get up.   6. If you have nausea (feel sick to your stomach): Drink only clear liquids such as apple juice, ginger ale, broth or 7-Up.  Rest may also help.  Be sure to drink enough fluids.  Move to a regular diet as you feel able.  7. You may have a slight fever. Call the doctor if your fever is over 100 F (37.7 C) (taken under the tongue) or lasts longer than 24 hours.  8. You may have a dry mouth, a sore throat, muscle aches or trouble sleeping.  These should go away after 24 hours.  9. Do not make important or legal decisions.   Call your doctor for any of the followin.  Signs of infection (fever, growing tenderness at the surgery site, a large amount of drainage or bleeding, severe pain, foul-smelling drainage, redness, swelling).    2. It has been over 8 to 10 hours since surgery and you are still not able to urinate (pass water).    3.  Headache for over 24 hours.    4.  Numbness, tingling or weakness the day after surgery (if you had spinal anesthesia).  To contact a doctor, call ________________________________________ or:        [phone] and ask for the resident on call for   ______________________________________________ (answered 24 hours a day)      Emergency Department:    [facility]: [phone]       (TTY for hearing impaired: [phone])    [facility]: [phone]       (TTY for  hearing impaired: [phone])  Discharge Instructions: Following a Dilation   and Curettage/Dilation and Evacuation    What to expect:    Expect small to moderate amount of vaginal bleeding which should taper off in 4-5 days. It should not be heavier than your regular menstrual flow.    Do not douche, and use a pad rather than tampons.     No intercourse until bleeding has ceased.    Activity:    Rest the day of surgery. You may resume normal activity the next day.    You may bathe or shower.    Avoid heavy lifting (10-15 lbs) for one week.    Comfort:    The amount of discomfort you can expect is very unpredictable. If you have pain that cannot be controlled with non-aspirin pain relievers or with the prescription you may have received, you should notify your doctor.    Abdominal cramping (like menstrual cramps) or low back ache are common and should not be a cause for concern. You will be drowsy and weak the day of surgery and possibly the following day.    Diet:    You have no restrictions on your diet. Following surgery, drink plenty of fluids and eat a light meal.    Nausea:    The anesthesia medications you received during your surgical procedure may produce some nausea.    If you feel nauseated, stay in bed, keep your head down and try drinking fluids such as Seven-Up, tea or soup.    Notify Physician at once if you experience:    A fever over 100 degrees (a low grade fever under 100 degrees is usual after surgery).    Heavy flow and/or passing large clots. Saturating more than 1 pad per hour for 2 or more hours.     Severe pain or cramps.  Rev. 5/12

## 2017-11-29 NOTE — ANESTHESIA POSTPROCEDURE EVALUATION
Patient: Robin Myhre    Procedure(s):  Dilation And Evacuation - Wound Class: II-Clean Contaminated    Diagnosis:Possible Placenta Acredia and Termination   Diagnosis Additional Information: No value filed.    Anesthesia Type:  General, ETT    Note:  Anesthesia Post Evaluation    Patient location during evaluation: PACU  Patient participation: Able to fully participate in evaluation  Level of consciousness: awake and alert  Pain management: adequate  Airway patency: patent  Cardiovascular status: acceptable  Respiratory status: acceptable  Hydration status: acceptable  PONV: none     Anesthetic complications: None          Last vitals:  Vitals:    11/29/17 0612 11/29/17 0845   BP: 115/80 119/78   Pulse: 85    Resp: 24 16   Temp: 36.7  C (98.1  F) 36.8  C (98.3  F)   SpO2: 100% 100%         Electronically Signed By: Jonny Bedoya MD, MD  November 29, 2017  10:34 AM

## 2017-11-29 NOTE — OR NURSING
Waiting for Rhogam shot from Pharmacy to be given. Patient crying and screaming on phone. Attempting to get dressed

## 2017-11-29 NOTE — BRIEF OP NOTE
Gynecology Brief Op Note    Robin Myhre  [id number]    Date of Surgery: 11/29/2017    Surgeon: Dr. Brianna Sanchez    Assistants: Iris Valenzuela MD PGY-1    Pre-operative Diagnoses: IUP at 16w5d by 15w6d US    Post-operative Diagnoses: s/p D&E    Procedure: Exam under anesthesia; Dilation and Evacuation    Anesthesia: MAC    EBL: 200cc  IVF: 900cc  UOP: not measured    Complications: None apparent  Findings: Exam under anesthesia revealed cervix dilated to 1cm externally but closed internally. D&E performed with multiple passes under ultrasound guidance. Cervical laceration noted at initial tenaculum site, initially hemostatic but with bleeding at conclusion of case, repaired. Hemostasis noted at end of case. Bimanual massage revealed uterus to be firm.     Specimens: Products of conception     Iris Valenzuela MD   PGY1, OB/GYN

## 2017-11-29 NOTE — IP AVS SNAPSHOT
MRN:[id number]                      After Visit Summary   11/29/2017    Robin Myhre    MRN: [id number]           Thank you!     Thank you for choosing [facility] for your care. Our goal is always to provide you with excellent care. Hearing back from our patients is one way we can continue to improve our services. Please take a few minutes to complete the written survey that you may receive in the mail after you visit with us. Thank you!        Patient Information     Date Of Birth          1995        About your hospital stay     You were admitted on:  November 29, 2017 You last received care in the:   MAIN OR    You were discharged on:  November 29, 2017       Who to Call     For medical emergencies, please call 911.  For non-urgent questions about your medical care, please call your primary care provider or clinic, None  For questions related to your surgery, please call your surgery clinic        Attending Provider     Provider Brianna Gordillo MD OB/Gyn       Primary Care Provider Fax #    [facility] [phone]      After Care Instructions     Discharge Instructions       Resume pre procedure diet            Discharge Instructions       Pelvic Rest. No tampons, douching or intercourse for  2  weeks.            No driving or operating machinery       No driving or operating machinery until day after procedure                  Further instructions from your care team       [facility]  Same-Day Surgery   Adult Discharge Orders & Instructions     For 24 hours after surgery    1. Get plenty of rest.  A responsible adult must stay with you for at least 24 hours after you leave the hospital.   2. Do not drive or use heavy equipment.  If you have weakness or tingling, don't drive or use heavy equipment until this feeling goes away.  3. Do not drink alcohol.  4. Avoid strenuous or risky activities.  Ask for help when climbing  stairs.   5. You may feel lightheaded.  IF so, sit for a few minutes before standing.  Have someone help you get up.   6. If you have nausea (feel sick to your stomach): Drink only clear liquids such as apple juice, ginger ale, broth or 7-Up.  Rest may also help.  Be sure to drink enough fluids.  Move to a regular diet as you feel able.  7. You may have a slight fever. Call the doctor if your fever is over 100 F (37.7 C) (taken under the tongue) or lasts longer than 24 hours.  8. You may have a dry mouth, a sore throat, muscle aches or trouble sleeping.  These should go away after 24 hours.  9. Do not make important or legal decisions.   Call your doctor for any of the followin.  Signs of infection (fever, growing tenderness at the surgery site, a large amount of drainage or bleeding, severe pain, foul-smelling drainage, redness, swelling).    2. It has been over 8 to 10 hours since surgery and you are still not able to urinate (pass water).    3.  Headache for over 24 hours.    4.  Numbness, tingling or weakness the day after surgery (if you had spinal anesthesia).  To contact a doctor, call ________________________________________ or:        [phone] and ask for the resident on call for   ______________________________________________ (answered 24 hours a day)      Emergency Department:    [facility]: [phone]       (TTY for hearing impaired: [phone])    [facility]: [phone]       (TTY for hearing impaired: [phone])  Discharge Instructions: Following a Dilation   and Curettage/Dilation and Evacuation    What to expect:    Expect small to moderate amount of vaginal bleeding which should taper off in 4-5 days. It should not be heavier than your regular menstrual flow.    Do not douche, and use a pad rather than tampons.     No intercourse until bleeding has ceased.    Activity:    Rest the day of surgery. You may resume normal activity the next day.    You may bathe or  "shower.    Avoid heavy lifting (10-15 lbs) for one week.    Comfort:    The amount of discomfort you can expect is very unpredictable. If you have pain that cannot be controlled with non-aspirin pain relievers or with the prescription you may have received, you should notify your doctor.    Abdominal cramping (like menstrual cramps) or low back ache are common and should not be a cause for concern. You will be drowsy and weak the day of surgery and possibly the following day.    Diet:    You have no restrictions on your diet. Following surgery, drink plenty of fluids and eat a light meal.    Nausea:    The anesthesia medications you received during your surgical procedure may produce some nausea.    If you feel nauseated, stay in bed, keep your head down and try drinking fluids such as Seven-Up, tea or soup.    Notify Physician at once if you experience:    A fever over 100 degrees (a low grade fever under 100 degrees is usual after surgery).    Heavy flow and/or passing large clots. Saturating more than 1 pad per hour for 2 or more hours.     Severe pain or cramps.  Rev. 5/12            Pending Results     No orders found from 11/27/2017 to 11/30/2017.            Admission Information     Date & Time Provider Department Dept. Phone    11/29/2017 Brianna Sepulveda MD [facility] MAIN [phone]      Your Vitals Were     Blood Pressure Pulse Temperature Respirations Height Weight    119/78 85 98.3  F (36.8  C) (Oral) 16 1.628 m (5' 4.09\") 72.1 kg (158 lb 15.2 oz)    Last Period Pulse Oximetry BMI (Body Mass Index)             07/30/2017 100% 27.2 kg/m2         airpim Information     airpim lets you send messages to your doctor, view your test results, renew your prescriptions, schedule appointments and more. To sign up, go to [email or web address] . Click on \"Log in\" on the left side of the screen, which will take you to the Welcome page. Then click on \"Sign up Now\" on the right side of the page.     You will " be asked to enter the access code listed below, as well as some personal information. Please follow the directions to create your username and password.     Your access code is: [email or web address]  Expires: 2018  6:31 AM     Your access code will  in 90 days. If you need help or a new code, please call your [facility] clinic or [phone].        Care EveryWhere ID     This is your Care EveryWhere ID. This could be used by other organizations to access your [facility] medical records  [id number]        Equal Access to Services     [facility]: Hadpramod lomax Sobandar, waantwon lehman, qakita kaalloni currie, frantz garcia . So [facility] [phone].    ATENCIÓN: Si habla español, tiene a ortega disposición servicios gratuitos de asistencia lingüística. Eric al [phone].    We comply with applicable federal civil rights laws and [place] laws. We do not discriminate on the basis of race, color, national origin, age, disability, sex, sexual orientation, or gender identity.               Review of your medicines      START taking        Dose / Directions    ibuprofen 600 MG tablet   Commonly known as:  ADVIL/MOTRIN   Used for:  Post-op pain        Dose:  600 mg   Take 1 tablet (600 mg) by mouth every 6 hours as needed for pain (mild)   Quantity:  30 tablet   Refills:  0         CONTINUE these medicines which have NOT CHANGED        Dose / Directions    insulin reg HIGH CONC 500 Units/mL injection   Commonly known as:  HumuLIN R U-500 HIGH CONC        Inject  Subcutaneous.   Refills:  0       levothyroxine 50 MCG tablet   Commonly known as:  SYNTHROID/LEVOTHROID        Dose:  50 mcg   Take 50 mcg by mouth daily   Refills:  0       oxyCODONE-acetaminophen  MG per tablet   Commonly known as:  PERCOCET   Used for:  Termination of pregnancy (fetus)        Dose:  1 tablet   Take 1 tablet by mouth every 6 hours as needed for moderate to severe pain maximum 6 tablet(s) per day    Quantity:  6 tablet   Refills:  0       promethazine 25 MG tablet   Commonly known as:  PHENERGAN   Used for:  Termination of pregnancy (fetus)        Dose:  25 mg   Take 1 tablet (25 mg) by mouth every 6 hours as needed for nausea   Quantity:  6 tablet   Refills:  0         STOP taking     metroNIDAZOLE 500 MG tablet   Commonly known as:  FLAGYL           MiFEPRIStone 200 MG tablet   Commonly known as:  MIFEPREX           misoprostol 200 MCG tablet   Commonly known as:  CYTOTEC                Where to get your medicines      These medications were sent to [facility] [place], [place] - [address]  [address] [address]     Phone:  [phone]     ibuprofen 600 MG tablet                Protect others around you: Learn how to safely use, store and throw away your medicines at www.disposemymeds.org.             Medication List: This is a list of all your medications and when to take them. Check marks below indicate your daily home schedule. Keep this list as a reference.      Medications           Morning Afternoon Evening Bedtime As Needed    ibuprofen 600 MG tablet   Commonly known as:  ADVIL/MOTRIN   Take 1 tablet (600 mg) by mouth every 6 hours as needed for pain (mild)                                insulin reg HIGH CONC 500 Units/mL injection   Commonly known as:  HumuLIN R U-500 HIGH CONC   Inject  Subcutaneous.                                levothyroxine 50 MCG tablet   Commonly known as:  SYNTHROID/LEVOTHROID   Take 50 mcg by mouth daily                                oxyCODONE-acetaminophen  MG per tablet   Commonly known as:  PERCOCET   Take 1 tablet by mouth every 6 hours as needed for moderate to severe pain maximum 6 tablet(s) per day                                promethazine 25 MG tablet   Commonly known as:  PHENERGAN   Take 1 tablet (25 mg) by mouth every 6 hours as needed for nausea

## 2017-11-29 NOTE — OR NURSING
Raghav refuses to get up into a recliner. She states her pain is at a 10. Dilaudid total of 0.5mg, fentanyl 50mcg given by CRNA on arrival to phase 2. I ibuprofen 600mg oral given. Patient crying in pain. She is abusive with language. Paging Dr Sepulveda and Iris Valenzuela Resident

## 2017-11-30 LAB
ABO + RH BLD: NORMAL
ABO + RH BLD: NORMAL
BLOOD BANK CMNT PATIENT-IMP: NORMAL
COPATH REPORT: NORMAL
DATE RH IMM GL GVN: NORMAL
FETAL CELL SCN BLD QL ROSETTE: NORMAL
RH IG VIALS RECOM PATIENT: NORMAL

## 2021-05-31 VITALS — HEIGHT: 63 IN | BODY MASS INDEX: 27.77 KG/M2 | WEIGHT: 156.7 LBS

## 2021-06-13 NOTE — PROGRESS NOTES
Progress Note    Reason for Visit:      Progress Note:    HPI:    This pleasant 22-year-old female patient is seen in consultation at the request of the primary care physician because of uncontrolled type 1 diabetes.    The patient has type 1 diabetes since the age of 11 she had diabetic ketoacidosis back in September.    She said that her pump was not working apparently.    She is on insulin pump [employer].  She also has continuous glucose monitoring sensor but she is not wearing it for now and says she says she is discharging.    The patient is largely noncompliant she has not been checking her blood sugar.    She is currently pregnant she is 13 weeks and this is her second pregnancy the fetus babies 2 years old.  The patient is not  she does not smoke or drink she has some nausea informed.    She is denying microvascular complications.      Review of Systems:    Nervous System: No headache, dizziness, fainting or memory loss. No tingling sensation of hand or feet.  Ears: No hearing loss or ringing in the ears  Eyes: No blurring of vision, redness, itching or dryness.  Nose: No nosebleed or loss of smell  Mouth: No mouth sores or loss of taste  Throat: No hoarseness or difficulty swallowing  Neck: No enlarged thyroid or lymph nodes.  Heart: No chest pain, palpitation or irregular heartbeat. No swelling of hands or feet  Lungs: No shortness of breath, cough, night sweats, wheezing or hemoptysis.  Gastrointestinal: No nausea or vomiting, constipation or diarrhea.  No acid reflux, abdominal pain or blood in stools.  Kidney/Bladdr: No polyuria, polydipsia, nocturia or hematuria.  Genital/Sexual: No loss of libido  Skin: No rash, hair loss or hirsutism.  No abnormal striae  Muscles/Joints/Bones: No morning stiffness, muscle aches and pain or loss of height.    Current Medications:  No current outpatient prescriptions on file.       Patients Active Problems:  There is no problem list on file for this  patient.      History:   reports that she has been smoking.  She does not have any smokeless tobacco history on file.   reports that she has been smoking.  She does not have any smokeless tobacco history on file. Her alcohol and drug histories are not on file.  History   Smoking Status     Current Every Day Smoker   Smokeless Tobacco     Not on file      reports that she has been smoking.  She does not have any smokeless tobacco history on file. Her alcohol and drug histories are not on file.  History   Sexual Activity     Sexual activity: Not on file     Past Medical History:   Diagnosis Date     Alcohol abuse      Diabetes     implanted insulin pump     No family history on file.  Past Medical History:   Diagnosis Date     Alcohol abuse      Diabetes     implanted insulin pump     No past surgical history on file.    Vitals   vitals were not taken for this visit.        Exam  General appearance: The patient looked well, not in acute distress.  Eyes: no evidence of thyroid eye disease.   Retinal exam: No evidence of diabetic retinopathy.  Mouth and Throat: Normal  Neck: No evidence of thyromegaly, enlarged lymph node or tenderness  Chest: Trachea is central. Chest is clear to auscultation and percussion. Breat sounds are normal.  Cardiovascular exam: JVP is not raised. Heart sounds are normal, no murmurs or rub  Peripheral pulses are palpable.   Abdomen: No masses or tenderness.    Back: No vertebral tenderness or kyphosis.  Extremities: No evidence of leg edema.   Skin: Normal to touch.  No abnormal striae  Neurologic exam:  Visual fields are intact by confrontation, grossly intact. No evidence of peripheral neuropathy.  Detailed foot exam normal.        Diagnosis:  No diagnosis found.    Orders:   No orders of the defined types were placed in this encounter.        Assessment and Plan: Type 1 diabetes poorly controlled the patient has not been checking her blood sugar I did advise her to check it before meals and  2 hours after meals we are aiming for fasting blood sugar before meals 95 or less 2 hours after meals 120 or less.    I will make an urgent referral to the diabetic educator.    I will see the patient again in 2 months.  Advised her to send us blood sugar in 1 week.    Hypothyroidism on Synthroid 50 mcg last A1c is 9.5 last TSH 4.75 free T4 1.05.    The patient says that she has not been taking her Synthroid at that time but she started now.    We will check thyroid function test I discussed with the patient in detail the significance of obtaining good control of diabetes we are aiming for A1c of 6 or less and also controlling her thyroid.    She will return to clinic in 2 month I did spend 60 minutes with the patient more than 50% was spent on counseling and managing her care.

## 2022-09-15 NOTE — IP AVS SNAPSHOT
MAIN OR    [address]    [place] [address]    Phone:  [phone]                                       After Visit Summary   11/29/2017    Robin Myhre    MRN: [id number]           After Visit Summary Signature Page     I have received my discharge instructions, and my questions have been answered. I have discussed any challenges I see with this plan with the nurse or doctor.    ..........................................................................................................................................  Patient/Patient Representative Signature      ..........................................................................................................................................  Patient Representative Print Name and Relationship to Patient    ..................................................               ................................................  Date                                            Time    ..........................................................................................................................................  Reviewed by Signature/Title    ...................................................              ..............................................  Date                                                            Time           Detail Level: Detailed Anesthesia Volume In Cc: 0 Post-Care Instructions: I reviewed with the patient in detail post-care instructions. Patient is to wear sunprotection, and avoid picking at any of the treated lesions. Pt may apply Vaseline to crusted or scabbing areas.  No charge today due to cosmetic Price (Use Numbers Only, No Special Characters Or $): 50 Consent: The patient's consent was obtained including but not limited to risks of crusting, scabbing, blistering, scarring, darker or lighter pigmentary change, recurrence, incomplete removal and infection.

## (undated) DEVICE — Device

## (undated) DEVICE — DECANTER TRANSFER DEVICE 2008S

## (undated) DEVICE — LINEN TOWEL PACK X5 5464

## (undated) DEVICE — SPECIMEN TRAP VACUUM SUCTION SAFETOUCH 003853-902

## (undated) DEVICE — SOL NACL 0.9% IRRIG 1000ML BOTTLE 2F7124

## (undated) DEVICE — LINEN GOWN X4 5410

## (undated) DEVICE — SYR 01ML 27GA 0.5" NDL TBC 309623

## (undated) DEVICE — PAD CHUX UNDERPAD 30X30"

## (undated) DEVICE — TUBING VACUUM COLLECTION 6FT 23116

## (undated) DEVICE — SPONGE RAY-TEC 4X8" 7318

## (undated) DEVICE — SUCTION VACUUM CANISTER STANDARD W/LID&CAPS 003987-901

## (undated) DEVICE — SUCTION CANNULA UTERINE 12MM CVD  21555

## (undated) DEVICE — NDL 18GA 1.5" 305196

## (undated) DEVICE — GLOVE PROTEXIS W/NEU-THERA 6.5  2D73TE65

## (undated) DEVICE — SOL WATER IRRIG 1000ML BOTTLE 2F7114

## (undated) DEVICE — SU VICRYL 2-0 CT-2 27" UND J269H

## (undated) RX ORDER — FENTANYL CITRATE 50 UG/ML
INJECTION, SOLUTION INTRAMUSCULAR; INTRAVENOUS
Status: DISPENSED
Start: 2017-11-29

## (undated) RX ORDER — DOXYCYCLINE 100 MG/10ML
INJECTION, POWDER, LYOPHILIZED, FOR SOLUTION INTRAVENOUS
Status: DISPENSED
Start: 2017-11-29

## (undated) RX ORDER — DEXAMETHASONE SODIUM PHOSPHATE 4 MG/ML
INJECTION, SOLUTION INTRA-ARTICULAR; INTRALESIONAL; INTRAMUSCULAR; INTRAVENOUS; SOFT TISSUE
Status: DISPENSED
Start: 2017-11-29

## (undated) RX ORDER — PROPOFOL 10 MG/ML
INJECTION, EMULSION INTRAVENOUS
Status: DISPENSED
Start: 2017-11-29

## (undated) RX ORDER — HYDROMORPHONE HYDROCHLORIDE 1 MG/ML
INJECTION, SOLUTION INTRAMUSCULAR; INTRAVENOUS; SUBCUTANEOUS
Status: DISPENSED
Start: 2017-11-29

## (undated) RX ORDER — ONDANSETRON 2 MG/ML
INJECTION INTRAMUSCULAR; INTRAVENOUS
Status: DISPENSED
Start: 2017-11-29

## (undated) RX ORDER — PHENAZOPYRIDINE HYDROCHLORIDE 200 MG/1
TABLET, FILM COATED ORAL
Status: DISPENSED
Start: 2017-11-29

## (undated) RX ORDER — IBUPROFEN 600 MG/1
TABLET, FILM COATED ORAL
Status: DISPENSED
Start: 2017-11-29

## (undated) RX ORDER — OXYCODONE AND ACETAMINOPHEN 5; 325 MG/1; MG/1
TABLET ORAL
Status: DISPENSED
Start: 2017-11-29